# Patient Record
Sex: MALE | Race: WHITE | NOT HISPANIC OR LATINO | URBAN - METROPOLITAN AREA
[De-identification: names, ages, dates, MRNs, and addresses within clinical notes are randomized per-mention and may not be internally consistent; named-entity substitution may affect disease eponyms.]

---

## 2024-06-20 ENCOUNTER — OFFICE VISIT (OUTPATIENT)
Dept: INTERNAL MEDICINE | Facility: CLINIC | Age: 19
End: 2024-06-20
Payer: MEDICAID

## 2024-06-20 VITALS
HEART RATE: 100 BPM | TEMPERATURE: 98 F | SYSTOLIC BLOOD PRESSURE: 129 MMHG | OXYGEN SATURATION: 96 % | WEIGHT: 315 LBS | DIASTOLIC BLOOD PRESSURE: 82 MMHG | BODY MASS INDEX: 44.1 KG/M2 | HEIGHT: 71 IN | RESPIRATION RATE: 18 BRPM

## 2024-06-20 DIAGNOSIS — Z13.1 SCREENING FOR DIABETES MELLITUS: ICD-10-CM

## 2024-06-20 DIAGNOSIS — Z11.4 SCREENING FOR HIV (HUMAN IMMUNODEFICIENCY VIRUS): ICD-10-CM

## 2024-06-20 DIAGNOSIS — Z76.89 ENCOUNTER TO ESTABLISH CARE: Primary | ICD-10-CM

## 2024-06-20 DIAGNOSIS — Z13.220 SCREENING FOR LIPID DISORDERS: ICD-10-CM

## 2024-06-20 DIAGNOSIS — E55.9 VITAMIN D DEFICIENCY: ICD-10-CM

## 2024-06-20 DIAGNOSIS — Z11.3 ROUTINE SCREENING FOR STI (SEXUALLY TRANSMITTED INFECTION): ICD-10-CM

## 2024-06-20 DIAGNOSIS — E66.01 CLASS 3 SEVERE OBESITY DUE TO EXCESS CALORIES WITHOUT SERIOUS COMORBIDITY WITH BODY MASS INDEX (BMI) OF 50.0 TO 59.9 IN ADULT: ICD-10-CM

## 2024-06-20 DIAGNOSIS — L21.9 SEBORRHEIC DERMATITIS: ICD-10-CM

## 2024-06-20 DIAGNOSIS — Z13.29 SCREENING FOR THYROID DISORDER: ICD-10-CM

## 2024-06-20 DIAGNOSIS — Z13.0 SCREENING FOR IRON DEFICIENCY ANEMIA: ICD-10-CM

## 2024-06-20 DIAGNOSIS — M62.838 SPASM OF MUSCLE: ICD-10-CM

## 2024-06-20 DIAGNOSIS — Z11.59 SCREENING FOR VIRAL DISEASE: ICD-10-CM

## 2024-06-20 PROCEDURE — 99204 OFFICE O/P NEW MOD 45 MIN: CPT | Mod: PBBFAC

## 2024-06-20 RX ORDER — KETOCONAZOLE 20 MG/ML
SHAMPOO, SUSPENSION TOPICAL
Qty: 120 ML | Refills: 0 | Status: SHIPPED | OUTPATIENT
Start: 2024-06-20 | End: 2024-07-20

## 2024-06-20 RX ORDER — METHOCARBAMOL 750 MG/1
750 TABLET, FILM COATED ORAL 3 TIMES DAILY PRN
Qty: 30 TABLET | Refills: 1 | Status: SHIPPED | OUTPATIENT
Start: 2024-06-20 | End: 2025-06-20

## 2024-06-20 NOTE — ASSESSMENT & PLAN NOTE
Wellness labs ordered - to be discussed at next office visit.  Encouraged patient to utilize patient portal for messaging.  Staff to request records from previous PCP.

## 2024-06-20 NOTE — PROGRESS NOTES
PATIENT NAME: Sowmya Goldsmith  : 2005  DATE: 24  MRN: [id number]          Reason for Visit/Chief Complaint   Establish Care and Muscle Pain (Right chest)       History of Present Illness (HPI)     Sowmya Goldsmith is a 18 y.o. White male presenting in clinic today to Establish Care and Muscle Pain (Right chest). Previous PCP: Dr. Steven Johnson. PMH: scoliosis, autism. Alexandria Goldsmith (mother) present during visit.     Patient reports intermittent right sided, chest muscle pain. States it last occurred on the ride over to this visit. Denies any perceived injury. He reports using a 20 lb dumbbell for exercise. He is right handed and uses his mouse for computer use daily.     Denies smoking, drinking, or illicit drug use. Denies chest pain, shortness of breath, cough, headache, dizziness, weakness, abdominal pain, nausea, vomiting, diarrhea, constipation, dysuria, depression, anxiety, SI, and HI.        Review of Systems     Review of Systems   Constitutional: Negative.    HENT: Negative.     Eyes: Negative.    Respiratory: Negative.     Cardiovascular: Negative.    Gastrointestinal: Negative.    Endocrine: Negative.    Genitourinary: Negative.    Musculoskeletal:  Positive for myalgias.   Skin: Negative.    Allergic/Immunologic: Negative.    Neurological: Negative.    Hematological: Negative.    Psychiatric/Behavioral: Negative.     All other systems reviewed and are negative.      Medical / Social / Family History     Past Medical History:   Diagnosis Date    Autism     Scoliosis          History reviewed. No pertinent surgical history.      Social History  Sowmya Goldsmith's  reports that he has never smoked. He has never used smokeless tobacco. He reports that he does not drink alcohol and does not use drugs.    Family History  Sowmya Goldsmith's family history includes Liver disease in his father; Ovarian cysts in his mother.    Medications and Allergies     Medications  Current Outpatient Medications  "  Medication Instructions    ketoconazole (NIZORAL) 2 % shampoo Topical (Top), Twice weekly    methocarbamoL (ROBAXIN) 750 mg, Oral, 3 times daily PRN       Allergies  Review of patient's allergies indicates:  No Known Allergies    Physical Examination   Visit Vitals  /82 (BP Location: Left arm, Patient Position: Sitting, BP Method: Large (Automatic))   Pulse 100   Temp 98.2 °F (36.8 °C) (Oral)   Resp 18   Ht 5' 11" (1.803 m)   Wt (!) 170.7 kg (376 lb 5.2 oz)   SpO2 96%   BMI 52.49 kg/m²     Physical Exam  Vitals reviewed.   Constitutional:       Appearance: Normal appearance. He is obese.   HENT:      Head: Normocephalic. Hair is abnormal.      Nose: Nose normal.      Mouth/Throat:      Mouth: Mucous membranes are moist.      Pharynx: Oropharynx is clear.   Eyes:      Extraocular Movements: Extraocular movements intact.      Conjunctiva/sclera: Conjunctivae normal.      Pupils: Pupils are equal, round, and reactive to light.   Cardiovascular:      Rate and Rhythm: Normal rate and regular rhythm.      Heart sounds: Normal heart sounds.   Pulmonary:      Effort: Pulmonary effort is normal.      Breath sounds: Normal breath sounds.   Abdominal:      General: Abdomen is flat. Bowel sounds are normal.      Palpations: Abdomen is soft.   Musculoskeletal:         General: Normal range of motion.      Cervical back: Normal range of motion.   Skin:     General: Skin is warm and dry.      Capillary Refill: Capillary refill takes less than 2 seconds.   Neurological:      General: No focal deficit present.      Mental Status: He is alert and oriented to person, place, and time.   Psychiatric:         Mood and Affect: Mood normal.           Results   No results found for: "WBC", "RBC", "HGB", "HCT", "MCV", "MCH", "MCHC", "RDW", "PLT", "MPV", "GRAN", "LYMPH", "MONO", "EOS", "BASO", "EOSINOPHIL", "BASOPHIL"   No results found for: "NA", "K", "CHLORIDE", "CO2", "GLUCOSE", "BUN", "CREATININE", "LABPROT", "ALBUMIN", "BILITOT", " ""ALKPHOS", "AST", "ALT", "AGAP", "EGFRNORACEVR"  No results found for: "TSH", "T4FREE"  No results found for: "CHOL", "HDL", "LDL", "TRIG"  No results found for: "COLORUA", "SGUA", "PHURINE", "PROTEINUA", "GLUCOSEUA", "BILIRUBINUA", "BLOODUA", "WBCUA", "RBCUA", "BACTERIA", "NITRITESUA", "LEUKOCYTESUR", "UROBILINOGEN"  No results found for: "CREATRANDUR", "MICALBCREAT"  No results found for: "AHIEOYJM16GK", "V12", "FOLATE"  No results found for: "HIV", "HEPAIGM", "HEPBSAG", "HEPCAB"  No results found for: "FITDIAG", "COLOGUARD"  No results found for: "OCCBLDIA"    Assessment and Plan (including Health Maintenance)     Problem List Items Addressed This Visit          Derm    Seborrheic dermatitis    Current Assessment & Plan     Rx ketoconazole shampoo - take as directed.         Relevant Medications    ketoconazole (NIZORAL) 2 % shampoo       Endocrine    Class 3 severe obesity due to excess calories without serious comorbidity with body mass index (BMI) of 50.0 to 59.9 in adult    Current Assessment & Plan     Body mass index is 52.49 kg/m².  Goal BMI <30.  Aerobic exercise 150 minutes per week.  Avoid soda, simple sugars, sweets, excessive rice, pasta, potatoes or bread.   Choose brown options when available and portion control.  Limit fast foods and fried foods.   Choose complex carbs in moderation (ex: green, leafy vegetables, beans, oatmeal).  Eat plenty of fresh fruits and vegetables with lean meats daily.   Consider permanent healthy lifestyle changes.             Orthopedic    Spasm of muscle    Current Assessment & Plan     Right pinpoint chest tenderness. Likely musculoskeletal in nature.   Rx methocarbamol.  Ok to take OTC ibuprofen as directed.         Relevant Medications    methocarbamoL (ROBAXIN) 750 MG Tab       Other    Encounter to establish care - Primary    Current Assessment & Plan     Wellness labs ordered - to be discussed at next office visit.  Encouraged patient to utilize patient portal for " messaging.  Staff to request records from previous PCP.         Relevant Orders    Urinalysis, Reflex to Urine Culture    Microalbumin/Creatinine Ratio, Urine    Comprehensive Metabolic Panel    CBC Auto Differential     Other Visit Diagnoses       Routine screening for STI (sexually transmitted infection)        Relevant Orders    SYPHILIS ANTIBODY (WITH REFLEX RPR)    Chlamydia/GC, PCR    Screening for iron deficiency anemia        Relevant Orders    Ferritin    Iron and TIBC    Screening for diabetes mellitus        Relevant Orders    Hemoglobin A1C    Screening for viral disease        Relevant Orders    Hepatitis Panel, Acute    Screening for HIV (human immunodeficiency virus)        Relevant Orders    HIV 1/2 Ag/Ab (4th Gen)    Screening for lipid disorders        Relevant Orders    Lipid Panel    Screening for thyroid disorder        Relevant Orders    TSH    T4, Free    Vitamin D deficiency        Relevant Orders    Vitamin D             Health Maintenance Due   Topic Date Due    Hepatitis C Screening  Never done    Lipid Panel  Never done    HIV Screening  Never done    HPV Vaccines (1 - Male 3-dose series) Never done    COVID-19 Vaccine (1 - 2023-24 season) Never done     All of your core healthy metrics are met.      Health Maintenance Topics with due status: Not Due       Topic Last Completion Date    TETANUS VACCINE 10/11/2016    Influenza Vaccine 10/11/2016       Future Appointments   Date Time Provider Department Center   7/18/2024  8:00 AM Shantal Doshi FNP [facility]        Follow up in about 4 weeks (around 7/18/2024) for F2F, Follow up, Med check, Lab review, Wellness, RTC PRN.          Signature:        EILEEN Arita  [facility]  [address] [facility] [address]    Date of encounter: 6/20/24

## 2024-06-20 NOTE — ASSESSMENT & PLAN NOTE
Right pinpoint chest tenderness. Likely musculoskeletal in nature.   Rx methocarbamol.  Ok to take OTC ibuprofen as directed.

## 2024-06-20 NOTE — ASSESSMENT & PLAN NOTE
Body mass index is 52.49 kg/m².  Goal BMI <30.  Aerobic exercise 150 minutes per week.  Avoid soda, simple sugars, sweets, excessive rice, pasta, potatoes or bread.   Choose brown options when available and portion control.  Limit fast foods and fried foods.   Choose complex carbs in moderation (ex: green, leafy vegetables, beans, oatmeal).  Eat plenty of fresh fruits and vegetables with lean meats daily.   Consider permanent healthy lifestyle changes.

## 2024-06-24 ENCOUNTER — TELEPHONE (OUTPATIENT)
Dept: INTERNAL MEDICINE | Facility: CLINIC | Age: 19
End: 2024-06-24
Payer: MEDICAID

## 2024-06-24 NOTE — TELEPHONE ENCOUNTER
----- Message from Melissa Jackson sent at 6/24/2024  8:46 AM CDT -----  Who Called: Sowmya Goldsmith    Caller is requesting assistance/information from provider's office.    Preferred Method of Contact: Phone Call  Patient's Preferred Phone Number on File: [phone] (mother meme)  Best Call Back Number, if different:  Additional Information:  medial advice, pt mother called to confirm status on forms for services on pt that was brought in to office to be filled out, please advise, thanks

## 2024-06-27 ENCOUNTER — TELEPHONE (OUTPATIENT)
Dept: INTERNAL MEDICINE | Facility: CLINIC | Age: 19
End: 2024-06-27
Payer: MEDICAID

## 2024-06-27 NOTE — TELEPHONE ENCOUNTER
----- Message from Reno Reyes sent at 6/27/2024 10:27 AM CDT -----  .Type:  Patient Returning Call    Who Called:eliana with medical resources   Who Left Message for Patient:  Does the patient know what this is regarding?:missing med list and please fax   Would the patient rather a call back or a response via [facility]? Call back   Best Call Back Number:[phone]  Additional Information: fax [phone]

## 2024-07-12 ENCOUNTER — LAB VISIT (OUTPATIENT)
Dept: LAB | Facility: HOSPITAL | Age: 19
End: 2024-07-12
Payer: MEDICAID

## 2024-07-12 DIAGNOSIS — Z11.4 SCREENING FOR HIV (HUMAN IMMUNODEFICIENCY VIRUS): ICD-10-CM

## 2024-07-12 DIAGNOSIS — Z13.1 SCREENING FOR DIABETES MELLITUS: ICD-10-CM

## 2024-07-12 DIAGNOSIS — E55.9 VITAMIN D DEFICIENCY: ICD-10-CM

## 2024-07-12 DIAGNOSIS — Z11.3 ROUTINE SCREENING FOR STI (SEXUALLY TRANSMITTED INFECTION): ICD-10-CM

## 2024-07-12 DIAGNOSIS — Z76.89 ENCOUNTER TO ESTABLISH CARE: ICD-10-CM

## 2024-07-12 DIAGNOSIS — Z11.59 SCREENING FOR VIRAL DISEASE: ICD-10-CM

## 2024-07-12 DIAGNOSIS — Z13.0 SCREENING FOR IRON DEFICIENCY ANEMIA: ICD-10-CM

## 2024-07-12 DIAGNOSIS — Z13.29 SCREENING FOR THYROID DISORDER: ICD-10-CM

## 2024-07-12 DIAGNOSIS — Z13.220 SCREENING FOR LIPID DISORDERS: ICD-10-CM

## 2024-07-12 LAB
25(OH)D3+25(OH)D2 SERPL-MCNC: 14 NG/ML (ref 30–80)
ALBUMIN SERPL-MCNC: 3.9 G/DL (ref 3.5–5)
ALBUMIN/GLOB SERPL: 1.1 RATIO (ref 1.1–2)
ALP SERPL-CCNC: 98 UNIT/L
ALT SERPL-CCNC: 34 UNIT/L (ref 0–55)
ANION GAP SERPL CALC-SCNC: 6 MEQ/L
AST SERPL-CCNC: 18 UNIT/L (ref 5–34)
BACTERIA #/AREA URNS AUTO: ABNORMAL /HPF
BASOPHILS # BLD AUTO: 0.06 X10(3)/MCL
BASOPHILS NFR BLD AUTO: 0.7 %
BILIRUB SERPL-MCNC: 0.4 MG/DL
BILIRUB UR QL STRIP.AUTO: NEGATIVE
BUN SERPL-MCNC: 7.7 MG/DL (ref 8.9–20.6)
C TRACH DNA SPEC QL NAA+PROBE: NOT DETECTED
CALCIUM SERPL-MCNC: 9.8 MG/DL (ref 8.4–10.2)
CHLORIDE SERPL-SCNC: 109 MMOL/L (ref 98–107)
CHOLEST SERPL-MCNC: 143 MG/DL
CHOLEST/HDLC SERPL: 5 {RATIO} (ref 0–5)
CLARITY UR: CLEAR
CO2 SERPL-SCNC: 25 MMOL/L (ref 22–29)
COLOR UR AUTO: YELLOW
CREAT SERPL-MCNC: 0.87 MG/DL (ref 0.73–1.18)
CREAT UR-MCNC: 241.2 MG/DL (ref 63–166)
CREAT/UREA NIT SERPL: 9
EOSINOPHIL # BLD AUTO: 0.62 X10(3)/MCL (ref 0–0.9)
EOSINOPHIL NFR BLD AUTO: 7.5 %
ERYTHROCYTE [DISTWIDTH] IN BLOOD BY AUTOMATED COUNT: 12.7 % (ref 11.5–17)
EST. AVERAGE GLUCOSE BLD GHB EST-MCNC: 116.9 MG/DL
FERRITIN SERPL-MCNC: 130.86 NG/ML (ref 21.81–274.66)
GFR SERPLBLD CREATININE-BSD FMLA CKD-EPI: >60 ML/MIN/1.73/M2
GLOBULIN SER-MCNC: 3.6 GM/DL (ref 2.4–3.5)
GLUCOSE SERPL-MCNC: 107 MG/DL (ref 74–100)
GLUCOSE UR QL STRIP: NORMAL
HAV IGM SERPL QL IA: NONREACTIVE
HBA1C MFR BLD: 5.7 %
HBV CORE IGM SERPL QL IA: NONREACTIVE
HBV SURFACE AG SERPL QL IA: NONREACTIVE
HCT VFR BLD AUTO: 44.6 % (ref 42–52)
HCV AB SERPL QL IA: NONREACTIVE
HDLC SERPL-MCNC: 27 MG/DL (ref 35–60)
HGB BLD-MCNC: 15.1 G/DL (ref 14–18)
HGB UR QL STRIP: NEGATIVE
HIV 1+2 AB+HIV1 P24 AG SERPL QL IA: NONREACTIVE
HYALINE CASTS #/AREA URNS LPF: ABNORMAL /LPF
IMM GRANULOCYTES # BLD AUTO: 0.02 X10(3)/MCL (ref 0–0.04)
IMM GRANULOCYTES NFR BLD AUTO: 0.2 %
IRON SATN MFR SERPL: 20 % (ref 20–50)
IRON SERPL-MCNC: 54 UG/DL (ref 65–175)
KETONES UR QL STRIP: NEGATIVE
LDLC SERPL CALC-MCNC: 71 MG/DL (ref 50–140)
LEUKOCYTE ESTERASE UR QL STRIP: 25
LYMPHOCYTES # BLD AUTO: 2.93 X10(3)/MCL (ref 0.6–4.6)
LYMPHOCYTES NFR BLD AUTO: 35.3 %
MCH RBC QN AUTO: 29.2 PG (ref 27–31)
MCHC RBC AUTO-ENTMCNC: 33.9 G/DL (ref 33–36)
MCV RBC AUTO: 86.3 FL (ref 80–94)
MICROALBUMIN UR-MCNC: 23.4 UG/ML
MICROALBUMIN/CREAT RATIO PNL UR: 9.7 MG/GM CR (ref 0–30)
MONOCYTES # BLD AUTO: 0.98 X10(3)/MCL (ref 0.1–1.3)
MONOCYTES NFR BLD AUTO: 11.8 %
MUCOUS THREADS URNS QL MICRO: ABNORMAL /LPF
N GONORRHOEA DNA SPEC QL NAA+PROBE: NOT DETECTED
NEUTROPHILS # BLD AUTO: 3.68 X10(3)/MCL (ref 2.1–9.2)
NEUTROPHILS NFR BLD AUTO: 44.5 %
NITRITE UR QL STRIP: NEGATIVE
NRBC BLD AUTO-RTO: 0 %
PH UR STRIP: 7 [PH]
PLATELET # BLD AUTO: 256 X10(3)/MCL (ref 130–400)
PMV BLD AUTO: 9.5 FL (ref 7.4–10.4)
POTASSIUM SERPL-SCNC: 4.1 MMOL/L (ref 3.5–5.1)
PROT SERPL-MCNC: 7.5 GM/DL (ref 6.4–8.3)
PROT UR QL STRIP: ABNORMAL
RBC # BLD AUTO: 5.17 X10(6)/MCL (ref 4.7–6.1)
RBC #/AREA URNS AUTO: ABNORMAL /HPF
SODIUM SERPL-SCNC: 140 MMOL/L (ref 136–145)
SOURCE (OHS): NORMAL
SP GR UR STRIP.AUTO: 1.02 (ref 1–1.03)
SQUAMOUS #/AREA URNS LPF: ABNORMAL /HPF
T PALLIDUM AB SER QL: NONREACTIVE
T4 FREE SERPL-MCNC: 0.88 NG/DL (ref 0.7–1.48)
TIBC SERPL-MCNC: 212 UG/DL (ref 69–240)
TIBC SERPL-MCNC: 266 UG/DL (ref 250–450)
TRANSFERRIN SERPL-MCNC: 248 MG/DL (ref 174–364)
TRIGL SERPL-MCNC: 223 MG/DL (ref 34–140)
TSH SERPL-ACNC: 1.92 UIU/ML (ref 0.35–4.94)
UROBILINOGEN UR STRIP-ACNC: NORMAL
VLDLC SERPL CALC-MCNC: 45 MG/DL
WBC # BLD AUTO: 8.29 X10(3)/MCL (ref 4.5–11.5)
WBC #/AREA URNS AUTO: ABNORMAL /HPF

## 2024-07-12 PROCEDURE — 36415 COLL VENOUS BLD VENIPUNCTURE: CPT

## 2024-07-12 PROCEDURE — 80053 COMPREHEN METABOLIC PANEL: CPT

## 2024-07-12 PROCEDURE — 82306 VITAMIN D 25 HYDROXY: CPT

## 2024-07-12 PROCEDURE — 83036 HEMOGLOBIN GLYCOSYLATED A1C: CPT

## 2024-07-12 PROCEDURE — 87389 HIV-1 AG W/HIV-1&-2 AB AG IA: CPT

## 2024-07-12 PROCEDURE — 86780 TREPONEMA PALLIDUM: CPT

## 2024-07-12 PROCEDURE — 82570 ASSAY OF URINE CREATININE: CPT

## 2024-07-12 PROCEDURE — 81001 URINALYSIS AUTO W/SCOPE: CPT

## 2024-07-12 PROCEDURE — 84443 ASSAY THYROID STIM HORMONE: CPT

## 2024-07-12 PROCEDURE — 80074 ACUTE HEPATITIS PANEL: CPT

## 2024-07-12 PROCEDURE — 83550 IRON BINDING TEST: CPT

## 2024-07-12 PROCEDURE — 85025 COMPLETE CBC W/AUTO DIFF WBC: CPT

## 2024-07-12 PROCEDURE — 87491 CHLMYD TRACH DNA AMP PROBE: CPT

## 2024-07-12 PROCEDURE — 80061 LIPID PANEL: CPT

## 2024-07-12 PROCEDURE — 82728 ASSAY OF FERRITIN: CPT

## 2024-07-12 PROCEDURE — 84439 ASSAY OF FREE THYROXINE: CPT

## 2024-07-18 PROBLEM — E55.9 VITAMIN D DEFICIENCY: Status: ACTIVE | Noted: 2024-07-18

## 2024-07-18 PROBLEM — Z00.00 WELL ADULT EXAM: Status: ACTIVE | Noted: 2024-06-20

## 2024-07-23 ENCOUNTER — OFFICE VISIT (OUTPATIENT)
Dept: INTERNAL MEDICINE | Facility: CLINIC | Age: 19
End: 2024-07-23
Payer: MEDICAID

## 2024-07-23 ENCOUNTER — TELEPHONE (OUTPATIENT)
Dept: INTERNAL MEDICINE | Facility: CLINIC | Age: 19
End: 2024-07-23
Payer: MEDICAID

## 2024-07-23 VITALS
WEIGHT: 315 LBS | DIASTOLIC BLOOD PRESSURE: 85 MMHG | RESPIRATION RATE: 18 BRPM | SYSTOLIC BLOOD PRESSURE: 131 MMHG | BODY MASS INDEX: 44.1 KG/M2 | HEIGHT: 71 IN | HEART RATE: 82 BPM | OXYGEN SATURATION: 98 % | TEMPERATURE: 98 F

## 2024-07-23 DIAGNOSIS — E78.1 HYPERTRIGLYCERIDEMIA: ICD-10-CM

## 2024-07-23 DIAGNOSIS — D50.8 IRON DEFICIENCY ANEMIA SECONDARY TO INADEQUATE DIETARY IRON INTAKE: ICD-10-CM

## 2024-07-23 DIAGNOSIS — L21.9 SEBORRHEIC DERMATITIS: ICD-10-CM

## 2024-07-23 DIAGNOSIS — E66.01 CLASS 3 SEVERE OBESITY DUE TO EXCESS CALORIES WITHOUT SERIOUS COMORBIDITY WITH BODY MASS INDEX (BMI) OF 50.0 TO 59.9 IN ADULT: ICD-10-CM

## 2024-07-23 DIAGNOSIS — Z00.00 WELL ADULT EXAM: Primary | ICD-10-CM

## 2024-07-23 DIAGNOSIS — E55.9 VITAMIN D DEFICIENCY: ICD-10-CM

## 2024-07-23 PROBLEM — M62.838 SPASM OF MUSCLE: Status: RESOLVED | Noted: 2024-06-20 | Resolved: 2024-07-23

## 2024-07-23 PROCEDURE — 1159F MED LIST DOCD IN RCRD: CPT | Mod: CPTII,,,

## 2024-07-23 PROCEDURE — 3079F DIAST BP 80-89 MM HG: CPT | Mod: CPTII,,,

## 2024-07-23 PROCEDURE — 99395 PREV VISIT EST AGE 18-39: CPT | Mod: S$PBB,,,

## 2024-07-23 PROCEDURE — 3075F SYST BP GE 130 - 139MM HG: CPT | Mod: CPTII,,,

## 2024-07-23 PROCEDURE — 3008F BODY MASS INDEX DOCD: CPT | Mod: CPTII,,,

## 2024-07-23 PROCEDURE — 1160F RVW MEDS BY RX/DR IN RCRD: CPT | Mod: CPTII,,,

## 2024-07-23 PROCEDURE — 99214 OFFICE O/P EST MOD 30 MIN: CPT | Mod: PBBFAC

## 2024-07-23 PROCEDURE — 99213 OFFICE O/P EST LOW 20 MIN: CPT | Mod: S$PBB,25,,

## 2024-07-23 PROCEDURE — 3044F HG A1C LEVEL LT 7.0%: CPT | Mod: CPTII,,,

## 2024-07-23 RX ORDER — FERROUS SULFATE 325(65) MG
325 TABLET, DELAYED RELEASE (ENTERIC COATED) ORAL DAILY
Qty: 90 TABLET | Refills: 2 | Status: SHIPPED | OUTPATIENT
Start: 2024-07-23

## 2024-07-23 RX ORDER — ASPIRIN 325 MG
50000 TABLET, DELAYED RELEASE (ENTERIC COATED) ORAL
Qty: 12 CAPSULE | Refills: 1 | Status: SHIPPED | OUTPATIENT
Start: 2024-07-23 | End: 2025-07-23

## 2024-07-23 RX ORDER — KETOCONAZOLE 20 MG/ML
SHAMPOO, SUSPENSION TOPICAL
Qty: 120 ML | Refills: 3 | Status: SHIPPED | OUTPATIENT
Start: 2024-07-25 | End: 2025-07-25

## 2024-07-23 NOTE — ASSESSMENT & PLAN NOTE
Body mass index is 52.53 kg/m².  Goal BMI <30.  Aerobic exercise 150 minutes per week.  Avoid soda, simple sugars, sweets, excessive rice, pasta, potatoes or bread.   Choose brown options when available and portion control.  Limit fast foods and fried foods.   Choose complex carbs in moderation (ex: green, leafy vegetables, beans, oatmeal).  Eat plenty of fresh fruits and vegetables with lean meats daily.   Consider permanent healthy lifestyle changes.

## 2024-07-23 NOTE — ASSESSMENT & PLAN NOTE
Lab Results   Component Value Date    [id number] 14 (L) 07/12/2024     Educated on increasing foods high in Vitamin D such as fish oil, cod liver oil, salmon, milk fortified with vitamin D.  Rx Vitamin D3 48770 iu weekly.  Once complete with weekly vitamin D, take vitamin D 2000 iu daily - purchase OTC.  Repeat Vitamin D level as ordered.

## 2024-07-23 NOTE — ASSESSMENT & PLAN NOTE
Wellness labs - 7/12/2024.  Prostate Cancer Screening - Deferred due to age.   Colon Cancer Screening - Deferred due to age.   Eye Exam -Several years. List of local eye doctors given to patient.   Dental Exam - Several years. List of local dentists given to patient.   Vaccinations: Flu - Not currently being offered, recommended annually. / Tetanus - 10/11/2016

## 2024-07-23 NOTE — PATIENT INSTRUCTIONS
REMINDER: Please complete labs within 1 week of appointment.   Please complete satisfaction survey when received. Thank you.    Yaniv Deng,     If you are due for any health screening(s) below please notify me so we can arrange them to be ordered and scheduled. Most healthy patients at your age complete them, but you are free to accept or refuse.     If you can't do it, I'll definitely understand. If you can, I'd certainly appreciate it!    All of your core healthy metrics are met.

## 2024-07-23 NOTE — PROGRESS NOTES
PATIENT NAME: Sowmya Goldsmith  : 2005  DATE: 24  MRN: [id number]          Reason for Visit/Chief Complaint   Annual Exam and Results       History of Present Illness (HPI)     Sowmya Goldsmith is a 18 y.o. White male presenting in clinic today for an Annual Exam and Results. Previous PCP: Dr. Steven Johnson. PMH: scoliosis, autism. Alexandria Goldsmith (mother) present during visit.     All pertinent labs dated 2024 reviewed and discussed with patient.     Denies smoking, drinking, or illicit drug use. Denies chest pain, shortness of breath, cough, headache, dizziness, weakness, abdominal pain, nausea, vomiting, diarrhea, constipation, dysuria, depression, anxiety, SI, and HI.    Prostate Cancer Screening - Deferred due to age.   Colon Cancer Screening - Deferred due to age.   Eye Exam -Several years. List of local eye doctors given to patient.   Dental Exam - Several years. List of local dentists given to patient.   Vaccinations: Flu - Not currently being offered, recommended annually. / Tetanus - 10/11/2016           Review of Systems     Review of Systems   Constitutional: Negative.    HENT: Negative.     Eyes: Negative.    Respiratory: Negative.     Cardiovascular: Negative.    Gastrointestinal: Negative.    Endocrine: Negative.    Genitourinary: Negative.    Musculoskeletal:  Positive for myalgias.   Skin: Negative.    Allergic/Immunologic: Negative.    Neurological: Negative.    Hematological: Negative.    Psychiatric/Behavioral: Negative.     All other systems reviewed and are negative.      Medical / Social / Family History     Past Medical History:   Diagnosis Date    Autism     Scoliosis          History reviewed. No pertinent surgical history.      Social History  Sowmya Goldsmith's  reports that he has never smoked. He has never used smokeless tobacco. He reports that he does not drink alcohol and does not use drugs.    Family History  Sowmya Goldsmith's family history includes Liver disease in his  "father; Ovarian cysts in his mother.    Medications and Allergies     Medications  Current Outpatient Medications   Medication Instructions    cholecalciferol (vitamin D3) 50,000 Units, Oral, Every 7 days    ferrous sulfate 325 mg, Oral, Daily    [START ON 7/25/2024] ketoconazole (NIZORAL) 2 % shampoo Topical (Top), Twice weekly       Allergies  Review of patient's allergies indicates:  No Known Allergies    Physical Examination   Visit Vitals  /85 (BP Location: Right arm, Patient Position: Sitting, BP Method: Large (Automatic))   Pulse 82   Temp 98.2 °F (36.8 °C) (Oral)   Resp 18   Ht 5' 11" (1.803 m)   Wt (!) 170.8 kg (376 lb 9.6 oz)   SpO2 98%   BMI 52.53 kg/m²       Physical Exam  Vitals reviewed.   Constitutional:       Appearance: Normal appearance. He is obese.   HENT:      Head: Normocephalic. Hair is abnormal.      Nose: Nose normal.      Mouth/Throat:      Mouth: Mucous membranes are moist.      Pharynx: Oropharynx is clear.   Eyes:      Extraocular Movements: Extraocular movements intact.      Conjunctiva/sclera: Conjunctivae normal.      Pupils: Pupils are equal, round, and reactive to light.   Cardiovascular:      Rate and Rhythm: Normal rate and regular rhythm.      Heart sounds: Normal heart sounds.   Pulmonary:      Effort: Pulmonary effort is normal.      Breath sounds: Normal breath sounds.   Abdominal:      General: Abdomen is flat. Bowel sounds are normal.      Palpations: Abdomen is soft.   Musculoskeletal:         General: Normal range of motion.      Cervical back: Normal range of motion.   Skin:     General: Skin is warm and dry.      Capillary Refill: Capillary refill takes less than 2 seconds.   Neurological:      General: No focal deficit present.      Mental Status: He is alert and oriented to person, place, and time.   Psychiatric:         Mood and Affect: Mood normal.           Results     Lab Results   Component Value Date    WBC 8.29 07/12/2024    RBC 5.17 07/12/2024    HGB 15.1 " "07/12/2024    HCT 44.6 07/12/2024    MCV 86.3 07/12/2024    MCH 29.2 07/12/2024    MCHC 33.9 07/12/2024    RDW 12.7 07/12/2024     07/12/2024    MPV 9.5 07/12/2024      Lab Results   Component Value Date     07/12/2024    K 4.1 07/12/2024    CO2 25 07/12/2024    GLUCOSE 107 (H) 07/12/2024    BUN 7.7 (L) 07/12/2024    CREATININE 0.87 07/12/2024    LABPROT 7.5 07/12/2024    ALBUMIN 3.9 07/12/2024    BILITOT 0.4 07/12/2024    ALKPHOS 98 07/12/2024    AST 18 07/12/2024    ALT 34 07/12/2024    AGAP 6.0 07/12/2024    EGFRNORACEVR >60 07/12/2024     Lab Results   Component Value Date    TSH 1.924 07/12/2024     Lab Results   Component Value Date    CHOL 143 07/12/2024    HDL 27 (L) 07/12/2024    LDL 71.00 07/12/2024    TRIG 223 (H) 07/12/2024     Lab Results   Component Value Date    SGUA 1.025 07/12/2024    PROTEINUA Trace (A) 07/12/2024    BILIRUBINUA Negative 07/12/2024    WBCUA 0-5 07/12/2024    RBCUA 0-5 07/12/2024    BACTERIA None Seen 07/12/2024    LEUKOCYTESUR 25 (A) 07/12/2024    UROBILINOGEN Normal 07/12/2024     Lab Results   Component Value Date    CREATRANDUR 241.2 (H) 07/12/2024    MICALBCREAT 9.7 07/12/2024     Lab Results   Component Value Date    [id number] 14 (L) 07/12/2024     Lab Results   Component Value Date    HIV Nonreactive 07/12/2024    HEPAIGM Nonreactive 07/12/2024    HEPBSAG Nonreactive 07/12/2024    HEPCAB Nonreactive 07/12/2024     No results found for: "FITDIAG", "COLOGUARD"  No results found for: "OCCBLDIA"    Assessment and Plan (including Health Maintenance)     Problem List Items Addressed This Visit          Derm    Seborrheic dermatitis    Current Assessment & Plan     Continue ketoconazole shampoo - take as directed.         Relevant Medications    ketoconazole (NIZORAL) 2 % shampoo (Start on 7/25/2024)       Cardiac/Vascular    Hypertriglyceridemia    Current Assessment & Plan      Latest Reference Range & Units 07/12/24 07:14   Triglycerides 34 - 140 mg/dL 223 (H) "   (H): Data is abnormally high    Low fat diet.  150 minutes of cardio exercise weekly.         Relevant Orders    Comprehensive Metabolic Panel    Lipid Panel       Oncology    Iron deficiency anemia secondary to inadequate dietary iron intake    Current Assessment & Plan     Lab Results   Component Value Date    HCT 44.6 07/12/2024    HGB 15.1 07/12/2024    IRON 54 (L) 07/12/2024    FERRITIN 130.86 07/12/2024    TIBC 266 07/12/2024    LABIRON 20 07/12/2024   Rx ferrous sulfate.  Take iron supplements as prescribed.  Add Vitamin C to your diet.  Take iron and vitamin C on an empty stomach for better absorption if tolerated.  Add iron rich foods to diet such as liver, lean beef, eggs, dried fruit, dark green leafy vegetables.  Education provided on additional sources of iron-rich foods.  Do NOT drink milk or take antacids at the same time you take your iron supplement.  Iron supplements can cause constipation; add stool softener or fiber as needed.           Relevant Medications    ferrous sulfate 325 (65 FE) MG EC tablet    Other Relevant Orders    CBC Auto Differential    Ferritin    Iron and TIBC    Urinalysis       Endocrine    Class 3 severe obesity due to excess calories without serious comorbidity with body mass index (BMI) of 50.0 to 59.9 in adult    Current Assessment & Plan     Body mass index is 52.53 kg/m².  Goal BMI <30.  Aerobic exercise 150 minutes per week.  Avoid soda, simple sugars, sweets, excessive rice, pasta, potatoes or bread.   Choose brown options when available and portion control.  Limit fast foods and fried foods.   Choose complex carbs in moderation (ex: green, leafy vegetables, beans, oatmeal).  Eat plenty of fresh fruits and vegetables with lean meats daily.   Consider permanent healthy lifestyle changes.          Vitamin D deficiency    Current Assessment & Plan     Lab Results   Component Value Date    [id number] 14 (L) 07/12/2024     Educated on increasing foods high in Vitamin D  such as fish oil, cod liver oil, salmon, milk fortified with vitamin D.  Rx Vitamin D3 46704 iu weekly.  Once complete with weekly vitamin D, take vitamin D 2000 iu daily - purchase OTC.  Repeat Vitamin D level as ordered.           Relevant Medications    cholecalciferol, vitamin D3, 1,250 mcg (50,000 unit) capsule    Other Relevant Orders    Vitamin D       Other    Well adult exam - Primary    Current Assessment & Plan     Wellness labs - 7/12/2024.  Prostate Cancer Screening - Deferred due to age.   Colon Cancer Screening - Deferred due to age.   Eye Exam -Several years. List of local eye doctors given to patient.   Dental Exam - Several years. List of local dentists given to patient.   Vaccinations: Flu - Not currently being offered, recommended annually. / Tetanus - 10/11/2016                    Health Maintenance Due   Topic Date Due    HPV Vaccines (1 - Male 3-dose series) Never done    COVID-19 Vaccine (1 - 2023-24 season) Never done     All of your core healthy metrics are met.      Health Maintenance Topics with due status: Not Due       Topic Last Completion Date    TETANUS VACCINE 10/11/2016    Influenza Vaccine 10/11/2016       Future Appointments   Date Time Provider Department Center   1/23/2025  7:40 AM Shantal Doshi FNP [facility] [place] [place]          Follow up in about 6 months (around 1/23/2025) for Follow up, Med check, Lab review, Anemia, RTC PRN, F2F.          Signature:        EILEEN Arita  [facility]  [address] [facility] [address]    Date of encounter: 7/23/24

## 2024-07-23 NOTE — ASSESSMENT & PLAN NOTE
Lab Results   Component Value Date    HCT 44.6 07/12/2024    HGB 15.1 07/12/2024    IRON 54 (L) 07/12/2024    FERRITIN 130.86 07/12/2024    TIBC 266 07/12/2024    LABIRON 20 07/12/2024   Rx ferrous sulfate.  Take iron supplements as prescribed.  Add Vitamin C to your diet.  Take iron and vitamin C on an empty stomach for better absorption if tolerated.  Add iron rich foods to diet such as liver, lean beef, eggs, dried fruit, dark green leafy vegetables.  Education provided on additional sources of iron-rich foods.  Do NOT drink milk or take antacids at the same time you take your iron supplement.  Iron supplements can cause constipation; add stool softener or fiber as needed.

## 2024-07-23 NOTE — ASSESSMENT & PLAN NOTE
Latest Reference Range & Units 07/12/24 07:14   Triglycerides 34 - 140 mg/dL 223 (H)   (H): Data is abnormally high    Low fat diet.  150 minutes of cardio exercise weekly.